# Patient Record
Sex: FEMALE | Race: WHITE
[De-identification: names, ages, dates, MRNs, and addresses within clinical notes are randomized per-mention and may not be internally consistent; named-entity substitution may affect disease eponyms.]

---

## 2017-10-03 ENCOUNTER — HOSPITAL ENCOUNTER (OUTPATIENT)
Dept: HOSPITAL 1 - RD | Age: 77
Discharge: HOME | End: 2017-10-03
Attending: FAMILY MEDICINE
Payer: COMMERCIAL
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
## 2017-10-03 DIAGNOSIS — S89.92XA: Primary | ICD-10-CM

## 2017-10-31 ENCOUNTER — HOSPITAL ENCOUNTER (INPATIENT)
Dept: HOSPITAL 1 - ED | Age: 77
LOS: 3 days | Discharge: HOME | DRG: 73 | End: 2017-11-03
Attending: FAMILY MEDICINE | Admitting: FAMILY MEDICINE
Payer: COMMERCIAL

## 2017-10-31 VITALS — HEIGHT: 70 IN | BODY MASS INDEX: 23.62 KG/M2 | WEIGHT: 165 LBS

## 2017-10-31 DIAGNOSIS — G90.9: Primary | ICD-10-CM

## 2017-10-31 DIAGNOSIS — M62.830: ICD-10-CM

## 2017-10-31 DIAGNOSIS — Z79.01: ICD-10-CM

## 2017-10-31 DIAGNOSIS — M54.42: ICD-10-CM

## 2017-10-31 DIAGNOSIS — E78.5: ICD-10-CM

## 2017-10-31 DIAGNOSIS — I48.2: ICD-10-CM

## 2017-10-31 DIAGNOSIS — E44.0: ICD-10-CM

## 2017-10-31 DIAGNOSIS — N17.0: ICD-10-CM

## 2017-10-31 DIAGNOSIS — Z85.3: ICD-10-CM

## 2017-10-31 NOTE — NUR
PT BIB DAUGHTER FOR C/O PALPATATION AND WEAKNESS. PT STATES SYMPTOMS BEGAN WHEN
SHE WAS GETTING READY FOR BED. PT DENIES ANY CHEST PAIN, BUT STATES "I CANT
FEEL MY HEART BEATING". PT AWAKE AND ALERT, RESP EVEN AND UNLABORED. PT PLACED
ON FULL MONITORS, BEDSIDE RAILS UP FOR SAFETY, NO DISTRESS NOTED

## 2017-11-01 VITALS — SYSTOLIC BLOOD PRESSURE: 108 MMHG | DIASTOLIC BLOOD PRESSURE: 58 MMHG

## 2017-11-01 VITALS — SYSTOLIC BLOOD PRESSURE: 124 MMHG | DIASTOLIC BLOOD PRESSURE: 65 MMHG

## 2017-11-01 VITALS — SYSTOLIC BLOOD PRESSURE: 119 MMHG | DIASTOLIC BLOOD PRESSURE: 79 MMHG

## 2017-11-01 VITALS — DIASTOLIC BLOOD PRESSURE: 79 MMHG | SYSTOLIC BLOOD PRESSURE: 133 MMHG

## 2017-11-01 VITALS — SYSTOLIC BLOOD PRESSURE: 112 MMHG | DIASTOLIC BLOOD PRESSURE: 66 MMHG

## 2017-11-01 VITALS — SYSTOLIC BLOOD PRESSURE: 129 MMHG | DIASTOLIC BLOOD PRESSURE: 64 MMHG

## 2017-11-01 VITALS — SYSTOLIC BLOOD PRESSURE: 105 MMHG | DIASTOLIC BLOOD PRESSURE: 64 MMHG

## 2017-11-01 LAB
ALBUMIN SERPL-MCNC: 3.2 G/DL (ref 3.4–5)
ALP SERPL-CCNC: 59 U/L (ref 46–116)
ALT SERPL-CCNC: 21 U/L (ref 14–59)
AMPHETAMINES UR QL SCN: (no result)
AST SERPL-CCNC: 16 U/L (ref 15–37)
BASOPHILS NFR BLD: 0.1 % (ref 0–2)
BASOPHILS NFR BLD: 0.3 % (ref 0–2)
BILIRUB SERPL-MCNC: 0.26 MG/DL (ref 0.2–1)
BUN SERPL-MCNC: 14 MG/DL (ref 7–18)
BUN SERPL-MCNC: 20 MG/DL (ref 7–18)
CALCIUM SERPL-MCNC: 8.2 MG/DL (ref 8.5–10.1)
CALCIUM SERPL-MCNC: 8.3 MG/DL (ref 8.5–10.1)
CHLORIDE SERPL-SCNC: 108 MMOL/L (ref 98–107)
CHLORIDE SERPL-SCNC: 109 MMOL/L (ref 98–107)
CHOLEST SERPL-MCNC: 185 MG/DL (ref ?–200)
CHOLEST/HDLC SERPL: 2.9 MG/DL
CO2 SERPL-SCNC: 27.7 MMOL/L (ref 21–32)
CO2 SERPL-SCNC: 28.3 MMOL/L (ref 21–32)
CREAT SERPL-MCNC: 0.8 MG/DL (ref 0.6–1)
CREAT SERPL-MCNC: 0.8 MG/DL (ref 0.6–1)
ERYTHROCYTE [DISTWIDTH] IN BLOOD BY AUTOMATED COUNT: 12.9 % (ref 11.5–14.5)
ERYTHROCYTE [DISTWIDTH] IN BLOOD BY AUTOMATED COUNT: 13 % (ref 11.5–14.5)
GFR SERPLBLD BASED ON 1.73 SQ M-ARVRAT: (no result) ML/MIN
GFR SERPLBLD BASED ON 1.73 SQ M-ARVRAT: (no result) ML/MIN
GLUCOSE SERPL-MCNC: 81 MG/DL (ref 74–106)
GLUCOSE SERPL-MCNC: 92 MG/DL (ref 74–106)
HDLC SERPL-MCNC: 63 MG/DL (ref 40–60)
MAGNESIUM SERPL-MCNC: 2 MG/DL (ref 1.8–2.4)
MICROSCOPIC UR-IMP: YES
PHOSPHATE SERPL-MCNC: 3.9 MG/DL (ref 2.5–4.9)
PLATELET # BLD: 192 X10^3MCL (ref 130–400)
PLATELET # BLD: 193 X10^3MCL (ref 130–400)
POTASSIUM SERPL-SCNC: 3.2 MMOL/L (ref 3.5–5.1)
POTASSIUM SERPL-SCNC: 3.6 MMOL/L (ref 3.5–5.1)
PROT SERPL-MCNC: 7 G/DL (ref 6.4–8.2)
RBC # UR STRIP.AUTO: (no result) /UL
SODIUM SERPL-SCNC: 143 MMOL/L (ref 136–145)
SODIUM SERPL-SCNC: 144 MMOL/L (ref 136–145)
T3 SERPL-MCNC: 1.05 NG/ML
T3RU NFR SERPL: 33 % UPTAKE (ref 30–39)
T4 FREE SERPL-MCNC: 0.86 NG/DL (ref 0.76–1.46)
T4 SERPL-MCNC: 7 UG/DL (ref 4.7–13.3)
T4/T3 UPTAKE INDEX SERPL: 2.3 UG/DL (ref 1.4–4.5)
TRIGL SERPL-MCNC: 56 MG/DL (ref ?–150)
UA SPECIFIC GRAVITY: <=1.005 (ref 1–1.03)

## 2017-11-01 NOTE — NUR
PT IS A/O X4, VERBAL RESPONSIVE, ABLE TO TELL WHAT SHE NEEDS. DENY ANY
DIZZINESS AT THIS TIME. LUNG SOUND CLEAR BILATERAL, NO COUGH, NO SOB, PT IS ON
TELE 8, NSR, DENY ANY CHEST PAIN OR DISCOMFORT, BOWEL SOUND PRESENT ALL 4
QUADRANTS, NO DISTENTION, NO TENDER. PEDAL PULSE PRESENT BOTH FEET, NO EDEMA
NOTED, IV AT LEFT HAND, NO LEAKING, NO INFILTRATION. ALL ADLS ASSIST, ALL NEED
MET, CALL LIGHT IN REACH, WILL CONTINUE TO MONITOR.

## 2017-11-01 NOTE — NUR
RECEIVED PT FROM ED, NO ACUTE DISTRESS, A/O X4. DENIES CHEST PAIN OR DIZZINESS
AT THIS TIME. LUNG SOUNDS DIMINISHED IN BILATERAL BASES, DENIES SOB. PT
REPORTS DIARRHEA, LAST BM 10/31/17, BOWEL SOUNDS ACTIVE. IV PATENT AND INTACT.
BED IN LOWEST POSITION, SIDE RAILS UP X2, CALL LIGHT WITHIN REACH. ORIENTED PT
TO ROOM. WILL CONTINUE TO MONITOR.

## 2017-11-01 NOTE — NUR
COMFIRMED WITH DR. ROSENTHAL THAT HE WAS AWARE OF CONVERSION TO AFIB, MADE AWARE
PT HAD NO MEDICATION ON BOARD FOR RATE CONTROL, MD STATED HE WAS AWARE AND
WILL BE ORDERING MEDICATION. WILL AWAIT NEW ORDERS.

## 2017-11-01 NOTE — NUR
NOTED ORDERS FROM DR. ROSENTHAL FOR METOPROLOL 5MG IVP TO BE GIVEN AT THIS TIME.
PT MEDICATED AS ORDERED. PRE-ADMINSITRATION PT WITH  B/P 131/91. PT
ASSYMPTOMATIC AT THIS TIME. TELE MONITOR NOTIFIED OF MEDICATION PUSH AND GIVEN
SLOWLY OVER 5 MIN.
1340: PT CONVERTED BACK TO NSR WITH PAC/PVCS HR IN 70S. DENIES ANY DISCOMFORT.
1350: RECHECKED VS AT THIS TIME. B/P 112/66 HR 63 NSR WITH PAC/PVCS. PT
REMAINS ASSYMPTOMATIC. PAGE GATE MESSAGE SENT TO DR. ROSENTHAL. WILL CONT TO
MONITOR.

## 2017-11-01 NOTE — NUR
RECEIVED PT IN BED A/A/OX4 DENIES HA. RESP EVEN AND UNLABORED WITH CLEAR BS
BILAT. DENIES ANY SOB/CP/PRESSURE AT THIS TIME. NSR WITH FREQUENT PVC'S AND
PAC'S ON TELE. PER REPORT PT HAD RUNS OF AFIB LAST NIGHT. NONE REPORTED BY
TELE MONITOR THIS AM. PT WITH HX OF AFIB. RESP EVEN AND UNLABORED WITH
DIMINISHED BASES ON O2 AT 2L/MIN VIA NC. NO EDEMA NOTED WITH IVF TO LT HAND.
ABD SOFT, NONTENDER WITH ACTIVE BS X4. DENIES ANY N/V AT THIS TIME. REPORTS
EPISODES OF DIARRHEA THIS MORNING. REPORTS HAS BEEN HAVING DIARRRHEA OVER A
WEEK. PT AMBULATORY. CALL LIGHT IN REACH NEEDS ATTENDED TO.

## 2017-11-01 NOTE — NUR
SPOKE WITH DR. ARIADNA GARCIA HE WAS AWARE PT HAD CONVERTED BACK TO NSR.
MADE AWARE THAT PT MAY NOT TOLEARET IVP METOPROLOL SINCE SBP HAD DROPPED TO
106. PER MD WILL CHANGE ORDER, SINCE PT CURRENTLY HAS ORDER FOR METOPROLOL IVP
BID. WILL AWAIT ANY FURTHER ORDERS.

## 2017-11-01 NOTE — NUR
PT RESTING AT THIS TIME, DENIES ANY DISCOMFORT. IVF INFUSING. PT HAD 1
EPISODE OF CONVERTING TO AFIB, HAS REMAINED NSR WITH PACS/PVCS. DENIED ANY
EPISODES OF PALPITATION. STARTED ON METOPROLOL DAILY. CALL LIGHT IN REACH
NEEDS ATTENDED TO.

## 2017-11-01 NOTE — NUR
MADE AWARE BY TELE MONITOR THAT PT HAD CONVERTED BACK TO AFIB HR 'S. PT
ASSYMPTOMATIC SITTING AT BEDSIDE CHAIR WAITING FOR LUNCH. MESSAGE SENT TO DR. ROSENTHAL VIA PAGE GATE. WILL CONT TO MONITOR.

## 2017-11-01 NOTE — NUR
PT SLEPT PERIODICALLY THROUGHOUT NIGHT, NO ACUTE DISTRESS. ALL NEEDS MET AND
ATTENDED TO. NO SIGNIFICANT CHANGES. IV PATENT AND INTACT. BED IN LOWEST
POSITION, SIDE RAILS UP X2, SCDS IN PLACE, CALL LIGHT WITHIN REACH. WILL
ENDORSE CARE TO ONCOMING NURSE.

## 2017-11-02 VITALS — SYSTOLIC BLOOD PRESSURE: 135 MMHG | DIASTOLIC BLOOD PRESSURE: 64 MMHG

## 2017-11-02 VITALS — DIASTOLIC BLOOD PRESSURE: 67 MMHG | SYSTOLIC BLOOD PRESSURE: 130 MMHG

## 2017-11-02 VITALS — SYSTOLIC BLOOD PRESSURE: 143 MMHG | DIASTOLIC BLOOD PRESSURE: 78 MMHG

## 2017-11-02 VITALS — SYSTOLIC BLOOD PRESSURE: 144 MMHG | DIASTOLIC BLOOD PRESSURE: 79 MMHG

## 2017-11-02 VITALS — DIASTOLIC BLOOD PRESSURE: 67 MMHG | SYSTOLIC BLOOD PRESSURE: 119 MMHG

## 2017-11-02 LAB
BASOPHILS NFR BLD: 0.3 % (ref 0–2)
BUN SERPL-MCNC: 13 MG/DL (ref 7–18)
CALCIUM SERPL-MCNC: 8.4 MG/DL (ref 8.5–10.1)
CHLORIDE SERPL-SCNC: 111 MMOL/L (ref 98–107)
CO2 SERPL-SCNC: 28.5 MMOL/L (ref 21–32)
CREAT SERPL-MCNC: 0.7 MG/DL (ref 0.6–1)
ERYTHROCYTE [DISTWIDTH] IN BLOOD BY AUTOMATED COUNT: 13.5 % (ref 11.5–14.5)
GFR SERPLBLD BASED ON 1.73 SQ M-ARVRAT: (no result) ML/MIN
GLUCOSE SERPL-MCNC: 75 MG/DL (ref 74–106)
PLATELET # BLD: 167 X10^3MCL (ref 130–400)
POTASSIUM SERPL-SCNC: 4.4 MMOL/L (ref 3.5–5.1)
SODIUM SERPL-SCNC: 145 MMOL/L (ref 136–145)

## 2017-11-02 NOTE — NUR
PT RESTING COMFORTABLY AT THIS TIME WITH FAMILY AT BEDSIDE. DENIES ANY
DISCOMFORT. AMBULATING TO BR WITH SUPERVISION. CALL NO EPISODES OG AFIB
REPORTED. PT C/O WITH NSR WITH PACS/PVCS AND OCC BIGEMINY. NO EPISODES OF
CP/PRESSURE AT THIS TIME. CALL LIGHT IN REACH NEEDS ATTENDED TO.

## 2017-11-02 NOTE — NUR
RECEIVED PT IN BED A/A/OX4 DENIES HA. RESP EVEN AND UNLABORED WITH CLEAR BS
BILAT. DENIES ANY SOB/CP/PRESSURE AT THIS TIME. NSR WITH PACS/PVCS ON TELE.
DENIES ANY FURTHER EPISODES OF PALPITATION. NO EDEMA NOTED. ON COUMADIN FOR HX
AFIB. ABD SOFT, NONTENDER WITH ACTIVE BS X4. DENIES ANY N/V AT THIS TIME.
VOIDING FREELY. AMBULATORY. CALL LIGHT IN REACH NEEDS ATTENDED TO.

## 2017-11-02 NOTE — NUR
PT IS ALERT AND ORIENTED X 4. PLEASANT AND COOPERATIVE. LUNGS DIMINISHED ON
AUSCULTATIONS BILATERALLY. ROOM AIR. NEGATIVE CXR. AMBULATORY. STILL HAS IV NS
AT 40 ML PER HOUR INFUSING WELL IN THE LEFT HAND. PATENT AND INTACT. TELE NO.
8 NSR AND PT HAS HX OF A-FIB. STILL TAKING COUMADIN DAILY. PT C/O SHE HAS 4X
LOOSE STOOLS. ROUTINE LOMOTIL WAS ORDERED AND PT WAS MEDICATED. MADE
COMFORTABLE IN BED. CALL LIGHT WITHIN EASY REACH.

## 2017-11-02 NOTE — NUR
PT IS AWAKE, VERBAL RESPONSIVE, DENY ANY RESPIRATORY DISTRESS, DENY ANY PAIN
OR DISCOMFORT, DENY ANY CHEST PAIN, IV AT LEFT HAND, NO LEAKING, NO
INFILTRATON. ALL ADLS ASSIST, ALL NEED MET, CALL LIGHT IN REACH, WILL CONTINUE
TO MONITOR.

## 2017-11-02 NOTE — NUR
DISCUSSED WITH DR. ROSENTHAL IF PT STILL REQUIRED IVF AT 100ML/HR. PER MD WILL
DROP DRIP RATE TO 40ML/HR. WILL CONT TO MONITOR.

## 2017-11-03 VITALS — SYSTOLIC BLOOD PRESSURE: 144 MMHG | DIASTOLIC BLOOD PRESSURE: 71 MMHG

## 2017-11-03 VITALS — SYSTOLIC BLOOD PRESSURE: 118 MMHG | DIASTOLIC BLOOD PRESSURE: 71 MMHG

## 2017-11-03 VITALS — DIASTOLIC BLOOD PRESSURE: 71 MMHG | SYSTOLIC BLOOD PRESSURE: 144 MMHG

## 2017-11-03 VITALS — SYSTOLIC BLOOD PRESSURE: 122 MMHG | DIASTOLIC BLOOD PRESSURE: 59 MMHG

## 2017-11-03 LAB
BASOPHILS NFR BLD: 0.2 % (ref 0–2)
BUN SERPL-MCNC: 12 MG/DL (ref 7–18)
CALCIUM SERPL-MCNC: 8.3 MG/DL (ref 8.5–10.1)
CHLORIDE SERPL-SCNC: 109 MMOL/L (ref 98–107)
CO2 SERPL-SCNC: 28.9 MMOL/L (ref 21–32)
CREAT SERPL-MCNC: 0.7 MG/DL (ref 0.6–1)
ERYTHROCYTE [DISTWIDTH] IN BLOOD BY AUTOMATED COUNT: 12.9 % (ref 11.5–14.5)
GFR SERPLBLD BASED ON 1.73 SQ M-ARVRAT: (no result) ML/MIN
GLUCOSE SERPL-MCNC: 83 MG/DL (ref 74–106)
PLATELET # BLD: 166 X10^3MCL (ref 130–400)
POTASSIUM SERPL-SCNC: 3.8 MMOL/L (ref 3.5–5.1)
SODIUM SERPL-SCNC: 142 MMOL/L (ref 136–145)

## 2017-11-03 NOTE — NUR
PT STATED THAT AFTER THE SECOND DOSE OF LOMOTIL HER DIARRHEA CALMED DOWN.
STILL HAS IV NS AT 40 ML PER HOUR INFUSING WELL. MADE COMFORTABLE IN BED. CALL
LIGHT WITHIN EASY REACH.

## 2017-11-03 NOTE — NUR
PT SITTING AT SIDE OF BED, A/OX4. NO REPORT OF PAIN. NO DIZZINESS. NO CHEST
PAIN. NO SIGN OF ACUTE DISTRESS. IV FLUIDS FLOWING. BED IN LOW POSITION. CALL
LIGHT WITHIN REACH. WILL CONTINUE TO MONITOR.

## 2017-11-03 NOTE — NUR
PT D/C TO HOME, AMBULATORY. ESCORTED BY DAVIAN BORGES. A/OX4. NO REPORT OF PAIN.
NO SIGN OF ACUTE DISTRESS. NO DIZZINESS REPORTED. TELE REMOVED. IV REMOVED,
CATHETER IN TACT. PATIENT EDUCATION PROVIDED. DIRECTED TO FOLLOW UP WITH PCP
AT UPCOMING APPT. PT VERBALIZED UNDERSTANDING. BELONGINGS WITH PATIENT.

## 2017-12-31 ENCOUNTER — HOSPITAL ENCOUNTER (EMERGENCY)
Dept: HOSPITAL 1 - ED | Age: 77
LOS: 1 days | Discharge: HOME | End: 2018-01-01
Payer: COMMERCIAL

## 2017-12-31 VITALS
HEIGHT: 70 IN | BODY MASS INDEX: 22.9 KG/M2 | WEIGHT: 159.99 LBS | WEIGHT: 159.99 LBS | BODY MASS INDEX: 22.9 KG/M2 | HEIGHT: 70 IN
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
## 2017-12-31 DIAGNOSIS — Z85.3: ICD-10-CM
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
## 2017-12-31 DIAGNOSIS — S42.201A: Primary | ICD-10-CM

## 2017-12-31 DIAGNOSIS — Z88.1: ICD-10-CM
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
## 2018-01-01 VITALS — SYSTOLIC BLOOD PRESSURE: 117 MMHG | DIASTOLIC BLOOD PRESSURE: 67 MMHG

## 2018-02-02 ENCOUNTER — HOSPITAL ENCOUNTER (OUTPATIENT)
Dept: HOSPITAL 1 - US | Age: 78
Discharge: HOME | End: 2018-02-02
Attending: FAMILY MEDICINE
Payer: COMMERCIAL

## 2018-02-02 DIAGNOSIS — R10.9: Primary | ICD-10-CM

## 2018-02-02 PROCEDURE — BW40ZZZ ULTRASONOGRAPHY OF ABDOMEN: ICD-10-PCS

## 2018-05-18 ENCOUNTER — HOSPITAL ENCOUNTER (EMERGENCY)
Dept: HOSPITAL 1 - ED | Age: 78
Discharge: HOME | End: 2018-05-18
Payer: COMMERCIAL

## 2018-05-18 VITALS — BODY MASS INDEX: 26.13 KG/M2 | WEIGHT: 182.5 LBS | HEIGHT: 70 IN

## 2018-05-18 VITALS — SYSTOLIC BLOOD PRESSURE: 117 MMHG | DIASTOLIC BLOOD PRESSURE: 90 MMHG

## 2018-05-18 DIAGNOSIS — R05: ICD-10-CM

## 2018-05-18 DIAGNOSIS — I11.9: Primary | ICD-10-CM

## 2018-05-18 DIAGNOSIS — Z88.8: ICD-10-CM

## 2018-05-18 DIAGNOSIS — Z90.710: ICD-10-CM

## 2018-05-18 LAB
ALBUMIN SERPL-MCNC: 2.8 G/DL (ref 3.4–5)
ALP SERPL-CCNC: 159 U/L (ref 46–116)
ALT SERPL-CCNC: 67 U/L (ref 14–59)
AST SERPL-CCNC: 29 U/L (ref 15–37)
BASOPHILS NFR BLD: 0.1 % (ref 0–2)
BILIRUB SERPL-MCNC: 0.5 MG/DL (ref 0.2–1)
BUN SERPL-MCNC: 13 MG/DL (ref 7–18)
CALCIUM SERPL-MCNC: 8.1 MG/DL (ref 8.5–10.1)
CHLORIDE SERPL-SCNC: 105 MMOL/L (ref 98–107)
CHOLEST SERPL-MCNC: 116 MG/DL (ref ?–200)
CO2 SERPL-SCNC: 23.8 MMOL/L (ref 21–32)
CREAT SERPL-MCNC: 0.8 MG/DL (ref 0.6–1)
ERYTHROCYTE [DISTWIDTH] IN BLOOD BY AUTOMATED COUNT: 15.4 % (ref 11.5–14.5)
GFR SERPLBLD BASED ON 1.73 SQ M-ARVRAT: (no result) ML/MIN
GLUCOSE SERPL-MCNC: 122 MG/DL (ref 74–106)
HDLC SERPL-MCNC: 25 MG/DL (ref 40–60)
PLATELET # BLD: 186 X10^3MCL (ref 130–400)
POTASSIUM SERPL-SCNC: 4.2 MMOL/L (ref 3.5–5.1)
PROT SERPL-MCNC: 6.2 G/DL (ref 6.4–8.2)
SODIUM SERPL-SCNC: 136 MMOL/L (ref 136–145)

## 2018-05-19 ENCOUNTER — HOSPITAL ENCOUNTER (INPATIENT)
Dept: HOSPITAL 1 - ED | Age: 78
LOS: 5 days | Discharge: HOME HEALTH | DRG: 286 | End: 2018-05-24
Attending: FAMILY MEDICINE | Admitting: FAMILY MEDICINE
Payer: COMMERCIAL

## 2018-05-19 VITALS — SYSTOLIC BLOOD PRESSURE: 112 MMHG | DIASTOLIC BLOOD PRESSURE: 65 MMHG

## 2018-05-19 VITALS
WEIGHT: 167.44 LBS | BODY MASS INDEX: 24.8 KG/M2 | BODY MASS INDEX: 24.8 KG/M2 | HEIGHT: 69.02 IN | HEIGHT: 69.02 IN | WEIGHT: 167.44 LBS

## 2018-05-19 VITALS — DIASTOLIC BLOOD PRESSURE: 65 MMHG | SYSTOLIC BLOOD PRESSURE: 110 MMHG

## 2018-05-19 VITALS — SYSTOLIC BLOOD PRESSURE: 103 MMHG | DIASTOLIC BLOOD PRESSURE: 58 MMHG

## 2018-05-19 DIAGNOSIS — I08.3: ICD-10-CM

## 2018-05-19 DIAGNOSIS — E87.6: ICD-10-CM

## 2018-05-19 DIAGNOSIS — E11.9: ICD-10-CM

## 2018-05-19 DIAGNOSIS — Z99.81: ICD-10-CM

## 2018-05-19 DIAGNOSIS — Z88.1: ICD-10-CM

## 2018-05-19 DIAGNOSIS — Z79.899: ICD-10-CM

## 2018-05-19 DIAGNOSIS — J90: ICD-10-CM

## 2018-05-19 DIAGNOSIS — Z53.29: ICD-10-CM

## 2018-05-19 DIAGNOSIS — Z79.01: ICD-10-CM
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
## 2018-05-19 DIAGNOSIS — I48.2: ICD-10-CM

## 2018-05-19 DIAGNOSIS — J96.01: ICD-10-CM

## 2018-05-19 DIAGNOSIS — I11.0: Primary | ICD-10-CM

## 2018-05-19 DIAGNOSIS — D64.9: ICD-10-CM

## 2018-05-19 DIAGNOSIS — E87.2: ICD-10-CM

## 2018-05-19 DIAGNOSIS — N17.0: ICD-10-CM

## 2018-05-19 DIAGNOSIS — E44.0: ICD-10-CM

## 2018-05-19 DIAGNOSIS — G90.8: ICD-10-CM

## 2018-05-19 DIAGNOSIS — I25.10: ICD-10-CM

## 2018-05-19 DIAGNOSIS — Z90.710: ICD-10-CM

## 2018-05-19 DIAGNOSIS — E87.1: ICD-10-CM

## 2018-05-19 DIAGNOSIS — S09.90XA: ICD-10-CM
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
## 2018-05-19 DIAGNOSIS — R74.0: ICD-10-CM

## 2018-05-19 DIAGNOSIS — S70.02XA: ICD-10-CM

## 2018-05-19 DIAGNOSIS — E83.51: ICD-10-CM

## 2018-05-19 DIAGNOSIS — J44.1: ICD-10-CM

## 2018-05-19 DIAGNOSIS — J69.0: ICD-10-CM
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
## 2018-05-19 DIAGNOSIS — I50.43: ICD-10-CM

## 2018-05-19 DIAGNOSIS — Z90.11: ICD-10-CM

## 2018-05-19 DIAGNOSIS — F32.9: ICD-10-CM

## 2018-05-19 LAB
ALBUMIN SERPL-MCNC: 3.1 G/DL (ref 3.4–5)
ALP SERPL-CCNC: 196 U/L (ref 46–116)
ALT SERPL-CCNC: 103 U/L (ref 14–59)
AMYLASE SERPL-CCNC: 18 U/L (ref 25–115)
AST SERPL-CCNC: 78 U/L (ref 15–37)
BASOPHILS NFR BLD: 0 % (ref 0–2)
BILIRUB SERPL-MCNC: 1.2 MG/DL (ref 0.2–1)
BUN SERPL-MCNC: 23 MG/DL (ref 7–18)
CALCIUM SERPL-MCNC: 8.1 MG/DL (ref 8.5–10.1)
CHLORIDE SERPL-SCNC: 100 MMOL/L (ref 98–107)
CO2 SERPL-SCNC: 23.2 MMOL/L (ref 21–32)
CREAT SERPL-MCNC: 1.1 MG/DL (ref 0.6–1)
ERYTHROCYTE [DISTWIDTH] IN BLOOD BY AUTOMATED COUNT: 15.5 % (ref 11.5–14.5)
GFR SERPLBLD BASED ON 1.73 SQ M-ARVRAT: (no result) ML/MIN
GLUCOSE SERPL-MCNC: 119 MG/DL (ref 74–106)
LIPASE SERPL-CCNC: 98 IU/L (ref 73–393)
MAGNESIUM SERPL-MCNC: 2 MG/DL (ref 1.8–2.4)
PHOSPHATE SERPL-MCNC: 4.9 MG/DL (ref 2.5–4.9)
PLATELET # BLD: 193 X10^3MCL (ref 130–400)
POTASSIUM SERPL-SCNC: 5.1 MMOL/L (ref 3.5–5.1)
PROT SERPL-MCNC: 6.6 G/DL (ref 6.4–8.2)
SODIUM SERPL-SCNC: 131 MMOL/L (ref 136–145)
T3 SERPL-MCNC: 0.58 NG/ML
T3RU NFR SERPL: 40 % UPTAKE (ref 30–39)
T4 FREE SERPL-MCNC: 1.47 NG/DL (ref 0.76–1.46)
T4 SERPL-MCNC: 6.5 UG/DL (ref 4.7–13.3)
T4/T3 UPTAKE INDEX SERPL: 2.6 UG/DL (ref 1.4–4.5)

## 2018-05-19 PROCEDURE — C1729 CATH, DRAINAGE: HCPCS

## 2018-05-19 PROCEDURE — C1751 CATH, INF, PER/CENT/MIDLINE: HCPCS

## 2018-05-19 PROCEDURE — C1769 GUIDE WIRE: HCPCS

## 2018-05-19 PROCEDURE — C1760 CLOSURE DEV, VASC: HCPCS

## 2018-05-19 PROCEDURE — C1894 INTRO/SHEATH, NON-LASER: HCPCS

## 2018-05-20 VITALS — DIASTOLIC BLOOD PRESSURE: 56 MMHG | SYSTOLIC BLOOD PRESSURE: 98 MMHG

## 2018-05-20 VITALS — SYSTOLIC BLOOD PRESSURE: 105 MMHG | DIASTOLIC BLOOD PRESSURE: 64 MMHG

## 2018-05-20 VITALS — DIASTOLIC BLOOD PRESSURE: 62 MMHG | SYSTOLIC BLOOD PRESSURE: 89 MMHG

## 2018-05-20 VITALS — DIASTOLIC BLOOD PRESSURE: 51 MMHG | SYSTOLIC BLOOD PRESSURE: 83 MMHG

## 2018-05-20 VITALS — SYSTOLIC BLOOD PRESSURE: 88 MMHG | DIASTOLIC BLOOD PRESSURE: 58 MMHG

## 2018-05-20 VITALS — DIASTOLIC BLOOD PRESSURE: 63 MMHG | SYSTOLIC BLOOD PRESSURE: 93 MMHG

## 2018-05-20 VITALS — SYSTOLIC BLOOD PRESSURE: 103 MMHG | DIASTOLIC BLOOD PRESSURE: 61 MMHG

## 2018-05-20 LAB
APPEARANCE SPUN FLD: (no result)
BASOPHILS NFR BLD: 0 % (ref 0–2)
BODY FLD TYPE: (no result)
BUN SERPL-MCNC: 21 MG/DL (ref 7–18)
CALCIUM SERPL-MCNC: 8.2 MG/DL (ref 8.5–10.1)
CHLORIDE SERPL-SCNC: 102 MMOL/L (ref 98–107)
CO2 SERPL-SCNC: 27.1 MMOL/L (ref 21–32)
COLOR FLD: (no result)
CREAT SERPL-MCNC: 0.9 MG/DL (ref 0.6–1)
ERYTHROCYTE [DISTWIDTH] IN BLOOD BY AUTOMATED COUNT: 15.1 % (ref 11.5–14.5)
GFR SERPLBLD BASED ON 1.73 SQ M-ARVRAT: (no result) ML/MIN
GLUCOSE SERPL-MCNC: 90 MG/DL (ref 74–106)
MAGNESIUM SERPL-MCNC: 1.8 MG/DL (ref 1.8–2.4)
MICROSCOPIC UR-IMP: NO
PHOSPHATE SERPL-MCNC: 4 MG/DL (ref 2.5–4.9)
PLATELET # BLD: 180 X10^3MCL (ref 130–400)
POTASSIUM SERPL-SCNC: 4.3 MMOL/L (ref 3.5–5.1)
RBC # FLD MANUAL: 138.9 /CUMM
RBC # UR STRIP.AUTO: NEGATIVE /UL
SODIUM SERPL-SCNC: 135 MMOL/L (ref 136–145)
UA SPECIFIC GRAVITY: 1.01 (ref 1–1.03)
WBC # FLD MANUAL: 3.3 /CUMM

## 2018-05-20 PROCEDURE — 0W9B3ZZ DRAINAGE OF LEFT PLEURAL CAVITY, PERCUTANEOUS APPROACH: ICD-10-PCS

## 2018-05-21 VITALS — DIASTOLIC BLOOD PRESSURE: 74 MMHG | SYSTOLIC BLOOD PRESSURE: 103 MMHG

## 2018-05-21 VITALS — SYSTOLIC BLOOD PRESSURE: 110 MMHG | DIASTOLIC BLOOD PRESSURE: 50 MMHG

## 2018-05-21 VITALS — DIASTOLIC BLOOD PRESSURE: 56 MMHG | SYSTOLIC BLOOD PRESSURE: 104 MMHG

## 2018-05-21 VITALS — SYSTOLIC BLOOD PRESSURE: 104 MMHG | DIASTOLIC BLOOD PRESSURE: 68 MMHG

## 2018-05-21 VITALS — DIASTOLIC BLOOD PRESSURE: 60 MMHG | SYSTOLIC BLOOD PRESSURE: 96 MMHG

## 2018-05-21 VITALS — SYSTOLIC BLOOD PRESSURE: 95 MMHG | DIASTOLIC BLOOD PRESSURE: 59 MMHG

## 2018-05-21 VITALS — SYSTOLIC BLOOD PRESSURE: 106 MMHG | DIASTOLIC BLOOD PRESSURE: 71 MMHG

## 2018-05-21 LAB
BASOPHILS NFR BLD: 0.3 % (ref 0–2)
BUN SERPL-MCNC: 14 MG/DL (ref 7–18)
CALCIUM SERPL-MCNC: 7.8 MG/DL (ref 8.5–10.1)
CHLORIDE SERPL-SCNC: 104 MMOL/L (ref 98–107)
CO2 SERPL-SCNC: 29.5 MMOL/L (ref 21–32)
CREAT SERPL-MCNC: 0.8 MG/DL (ref 0.6–1)
ERYTHROCYTE [DISTWIDTH] IN BLOOD BY AUTOMATED COUNT: 15.1 % (ref 11.5–14.5)
GFR SERPLBLD BASED ON 1.73 SQ M-ARVRAT: (no result) ML/MIN
GLUCOSE SERPL-MCNC: 87 MG/DL (ref 74–106)
MAGNESIUM SERPL-MCNC: 1.8 MG/DL (ref 1.8–2.4)
PHOSPHATE SERPL-MCNC: 3.5 MG/DL (ref 2.5–4.9)
PLATELET # BLD: 157 X10^3MCL (ref 130–400)
POTASSIUM SERPL-SCNC: 3.6 MMOL/L (ref 3.5–5.1)
SODIUM SERPL-SCNC: 141 MMOL/L (ref 136–145)

## 2018-05-21 PROCEDURE — B2151ZZ FLUOROSCOPY OF LEFT HEART USING LOW OSMOLAR CONTRAST: ICD-10-PCS

## 2018-05-21 PROCEDURE — B2111ZZ FLUOROSCOPY OF MULTIPLE CORONARY ARTERIES USING LOW OSMOLAR CONTRAST: ICD-10-PCS

## 2018-05-21 PROCEDURE — 4A023N8 MEASUREMENT OF CARDIAC SAMPLING AND PRESSURE, BILATERAL, PERCUTANEOUS APPROACH: ICD-10-PCS

## 2018-05-22 VITALS — SYSTOLIC BLOOD PRESSURE: 104 MMHG | DIASTOLIC BLOOD PRESSURE: 54 MMHG

## 2018-05-22 VITALS — SYSTOLIC BLOOD PRESSURE: 113 MMHG | DIASTOLIC BLOOD PRESSURE: 59 MMHG

## 2018-05-22 VITALS — DIASTOLIC BLOOD PRESSURE: 55 MMHG | SYSTOLIC BLOOD PRESSURE: 109 MMHG

## 2018-05-22 VITALS — SYSTOLIC BLOOD PRESSURE: 105 MMHG | DIASTOLIC BLOOD PRESSURE: 94 MMHG

## 2018-05-22 VITALS — SYSTOLIC BLOOD PRESSURE: 97 MMHG | DIASTOLIC BLOOD PRESSURE: 50 MMHG

## 2018-05-22 VITALS — SYSTOLIC BLOOD PRESSURE: 80 MMHG | DIASTOLIC BLOOD PRESSURE: 50 MMHG

## 2018-05-22 LAB
BASOPHILS NFR BLD: 0.1 % (ref 0–2)
BUN SERPL-MCNC: 10 MG/DL (ref 7–18)
CALCIUM SERPL-MCNC: 7.7 MG/DL (ref 8.5–10.1)
CHLORIDE SERPL-SCNC: 101 MMOL/L (ref 98–107)
CO2 SERPL-SCNC: 33.7 MMOL/L (ref 21–32)
CREAT SERPL-MCNC: 0.8 MG/DL (ref 0.6–1)
ERYTHROCYTE [DISTWIDTH] IN BLOOD BY AUTOMATED COUNT: 15.7 % (ref 11.5–14.5)
GFR SERPLBLD BASED ON 1.73 SQ M-ARVRAT: (no result) ML/MIN
GLUCOSE SERPL-MCNC: 141 MG/DL (ref 74–106)
PLATELET # BLD: 169 X10^3MCL (ref 130–400)
POTASSIUM SERPL-SCNC: 3.4 MMOL/L (ref 3.5–5.1)
SODIUM SERPL-SCNC: 139 MMOL/L (ref 136–145)

## 2018-05-23 VITALS — SYSTOLIC BLOOD PRESSURE: 123 MMHG | DIASTOLIC BLOOD PRESSURE: 77 MMHG

## 2018-05-23 VITALS — SYSTOLIC BLOOD PRESSURE: 94 MMHG | DIASTOLIC BLOOD PRESSURE: 58 MMHG

## 2018-05-23 VITALS — DIASTOLIC BLOOD PRESSURE: 49 MMHG | SYSTOLIC BLOOD PRESSURE: 107 MMHG

## 2018-05-23 VITALS — DIASTOLIC BLOOD PRESSURE: 53 MMHG | SYSTOLIC BLOOD PRESSURE: 114 MMHG

## 2018-05-23 VITALS — SYSTOLIC BLOOD PRESSURE: 115 MMHG | DIASTOLIC BLOOD PRESSURE: 54 MMHG

## 2018-05-23 VITALS — SYSTOLIC BLOOD PRESSURE: 113 MMHG | DIASTOLIC BLOOD PRESSURE: 64 MMHG

## 2018-05-23 VITALS — SYSTOLIC BLOOD PRESSURE: 111 MMHG | DIASTOLIC BLOOD PRESSURE: 52 MMHG

## 2018-05-23 VITALS — SYSTOLIC BLOOD PRESSURE: 119 MMHG | DIASTOLIC BLOOD PRESSURE: 67 MMHG

## 2018-05-23 VITALS — DIASTOLIC BLOOD PRESSURE: 89 MMHG | SYSTOLIC BLOOD PRESSURE: 110 MMHG

## 2018-05-23 VITALS — DIASTOLIC BLOOD PRESSURE: 79 MMHG | SYSTOLIC BLOOD PRESSURE: 125 MMHG

## 2018-05-23 LAB
BASOPHILS NFR BLD: 0.3 % (ref 0–2)
BUN SERPL-MCNC: 9 MG/DL (ref 7–18)
CALCIUM SERPL-MCNC: 8 MG/DL (ref 8.5–10.1)
CHLORIDE SERPL-SCNC: 103 MMOL/L (ref 98–107)
CO2 SERPL-SCNC: 30.7 MMOL/L (ref 21–32)
CREAT SERPL-MCNC: 0.6 MG/DL (ref 0.6–1)
ERYTHROCYTE [DISTWIDTH] IN BLOOD BY AUTOMATED COUNT: 15.4 % (ref 11.5–14.5)
GFR SERPLBLD BASED ON 1.73 SQ M-ARVRAT: (no result) ML/MIN
GLUCOSE SERPL-MCNC: 92 MG/DL (ref 74–106)
PLATELET # BLD: 176 X10^3MCL (ref 130–400)
POTASSIUM SERPL-SCNC: 3.5 MMOL/L (ref 3.5–5.1)
SODIUM SERPL-SCNC: 139 MMOL/L (ref 136–145)

## 2018-05-23 PROCEDURE — 0W9B3ZZ DRAINAGE OF LEFT PLEURAL CAVITY, PERCUTANEOUS APPROACH: ICD-10-PCS

## 2018-05-23 PROCEDURE — 0W993ZZ DRAINAGE OF RIGHT PLEURAL CAVITY, PERCUTANEOUS APPROACH: ICD-10-PCS

## 2018-05-24 VITALS — SYSTOLIC BLOOD PRESSURE: 102 MMHG | DIASTOLIC BLOOD PRESSURE: 66 MMHG

## 2018-05-24 VITALS — SYSTOLIC BLOOD PRESSURE: 102 MMHG | DIASTOLIC BLOOD PRESSURE: 60 MMHG

## 2018-05-24 VITALS — SYSTOLIC BLOOD PRESSURE: 104 MMHG | DIASTOLIC BLOOD PRESSURE: 65 MMHG

## 2018-05-24 VITALS — SYSTOLIC BLOOD PRESSURE: 120 MMHG | DIASTOLIC BLOOD PRESSURE: 78 MMHG

## 2018-05-24 VITALS — DIASTOLIC BLOOD PRESSURE: 76 MMHG | SYSTOLIC BLOOD PRESSURE: 112 MMHG

## 2018-05-24 LAB
BASOPHILS NFR BLD: 0.3 % (ref 0–2)
BUN SERPL-MCNC: 7 MG/DL (ref 7–18)
CALCIUM SERPL-MCNC: 8.7 MG/DL (ref 8.5–10.1)
CHLORIDE SERPL-SCNC: 105 MMOL/L (ref 98–107)
CO2 SERPL-SCNC: 32.2 MMOL/L (ref 21–32)
CREAT SERPL-MCNC: 0.7 MG/DL (ref 0.6–1)
ERYTHROCYTE [DISTWIDTH] IN BLOOD BY AUTOMATED COUNT: 15.4 % (ref 11.5–14.5)
GFR SERPLBLD BASED ON 1.73 SQ M-ARVRAT: (no result) ML/MIN
GLUCOSE SERPL-MCNC: 95 MG/DL (ref 74–106)
MAGNESIUM SERPL-MCNC: 2.1 MG/DL (ref 1.8–2.4)
PHOSPHATE SERPL-MCNC: 3.2 MG/DL (ref 2.5–4.9)
PLATELET # BLD: 186 X10^3MCL (ref 130–400)
POTASSIUM SERPL-SCNC: 3.5 MMOL/L (ref 3.5–5.1)
SODIUM SERPL-SCNC: 143 MMOL/L (ref 136–145)

## 2018-06-23 ENCOUNTER — HOSPITAL ENCOUNTER (INPATIENT)
Dept: HOSPITAL 1 - ED | Age: 78
LOS: 5 days | Discharge: HOME | DRG: 291 | End: 2018-06-28
Attending: FAMILY MEDICINE | Admitting: FAMILY MEDICINE
Payer: COMMERCIAL

## 2018-06-23 VITALS — SYSTOLIC BLOOD PRESSURE: 123 MMHG | DIASTOLIC BLOOD PRESSURE: 85 MMHG

## 2018-06-23 VITALS
BODY MASS INDEX: 22.62 KG/M2 | HEIGHT: 70 IN | HEIGHT: 70 IN | WEIGHT: 158 LBS | BODY MASS INDEX: 22.62 KG/M2 | WEIGHT: 158 LBS

## 2018-06-23 DIAGNOSIS — N18.2: ICD-10-CM

## 2018-06-23 DIAGNOSIS — F32.9: ICD-10-CM

## 2018-06-23 DIAGNOSIS — N17.0: ICD-10-CM

## 2018-06-23 DIAGNOSIS — I25.5: ICD-10-CM

## 2018-06-23 DIAGNOSIS — Z85.3: ICD-10-CM

## 2018-06-23 DIAGNOSIS — I50.43: ICD-10-CM

## 2018-06-23 DIAGNOSIS — I48.2: ICD-10-CM

## 2018-06-23 DIAGNOSIS — I36.1: ICD-10-CM

## 2018-06-23 DIAGNOSIS — I25.10: ICD-10-CM

## 2018-06-23 DIAGNOSIS — Z79.01: ICD-10-CM

## 2018-06-23 DIAGNOSIS — N28.1: ICD-10-CM

## 2018-06-23 DIAGNOSIS — I34.0: ICD-10-CM

## 2018-06-23 DIAGNOSIS — D64.9: ICD-10-CM

## 2018-06-23 DIAGNOSIS — M41.34: ICD-10-CM

## 2018-06-23 DIAGNOSIS — M24.852: ICD-10-CM

## 2018-06-23 DIAGNOSIS — I13.0: Primary | ICD-10-CM

## 2018-06-23 DIAGNOSIS — R31.9: ICD-10-CM

## 2018-06-23 DIAGNOSIS — E44.1: ICD-10-CM

## 2018-06-23 DIAGNOSIS — M24.851: ICD-10-CM

## 2018-06-23 LAB
ALBUMIN SERPL-MCNC: 3.3 G/DL (ref 3.4–5)
ALP SERPL-CCNC: 101 U/L (ref 46–116)
ALT SERPL-CCNC: 41 U/L (ref 14–59)
AMYLASE SERPL-CCNC: 14 U/L (ref 25–115)
AST SERPL-CCNC: 28 U/L (ref 15–37)
BASOPHILS NFR BLD: 0.1 % (ref 0–2)
BILIRUB SERPL-MCNC: 0.63 MG/DL (ref 0.2–1)
BUN SERPL-MCNC: 21 MG/DL (ref 7–18)
CALCIUM SERPL-MCNC: 8.9 MG/DL (ref 8.5–10.1)
CHLORIDE SERPL-SCNC: 102 MMOL/L (ref 98–107)
CO2 SERPL-SCNC: 23.8 MMOL/L (ref 21–32)
CREAT SERPL-MCNC: 0.9 MG/DL (ref 0.6–1)
ERYTHROCYTE [DISTWIDTH] IN BLOOD BY AUTOMATED COUNT: 16.3 % (ref 11.5–14.5)
GFR SERPLBLD BASED ON 1.73 SQ M-ARVRAT: (no result) ML/MIN
GLUCOSE SERPL-MCNC: 110 MG/DL (ref 74–106)
LIPASE SERPL-CCNC: 130 IU/L (ref 73–393)
MAGNESIUM SERPL-MCNC: 2.2 MG/DL (ref 1.8–2.4)
MICROSCOPIC UR-IMP: YES
PHOSPHATE SERPL-MCNC: 4.5 MG/DL (ref 2.5–4.9)
PLATELET # BLD: 166 X10^3MCL (ref 130–400)
POTASSIUM SERPL-SCNC: 4.3 MMOL/L (ref 3.5–5.1)
PROT SERPL-MCNC: 7.2 G/DL (ref 6.4–8.2)
RBC # UR STRIP.AUTO: (no result) /UL
SODIUM SERPL-SCNC: 138 MMOL/L (ref 136–145)
T3RU NFR SERPL: 37 % UPTAKE (ref 30–39)
T4 FREE SERPL-MCNC: 1.31 NG/DL (ref 0.76–1.46)
T4 SERPL-MCNC: 8.8 UG/DL (ref 4.7–13.3)
T4/T3 UPTAKE INDEX SERPL: 3.3 UG/DL (ref 1.4–4.5)
UA SPECIFIC GRAVITY: <=1.005 (ref 1–1.03)

## 2018-06-24 VITALS — DIASTOLIC BLOOD PRESSURE: 87 MMHG | SYSTOLIC BLOOD PRESSURE: 123 MMHG

## 2018-06-24 VITALS — DIASTOLIC BLOOD PRESSURE: 76 MMHG | SYSTOLIC BLOOD PRESSURE: 134 MMHG

## 2018-06-24 VITALS — SYSTOLIC BLOOD PRESSURE: 123 MMHG | DIASTOLIC BLOOD PRESSURE: 77 MMHG

## 2018-06-24 VITALS — DIASTOLIC BLOOD PRESSURE: 72 MMHG | SYSTOLIC BLOOD PRESSURE: 122 MMHG

## 2018-06-24 VITALS — DIASTOLIC BLOOD PRESSURE: 80 MMHG | SYSTOLIC BLOOD PRESSURE: 117 MMHG

## 2018-06-24 LAB
BASOPHILS NFR BLD: 0.2 % (ref 0–2)
BUN SERPL-MCNC: 16 MG/DL (ref 7–18)
CALCIUM SERPL-MCNC: 8.3 MG/DL (ref 8.5–10.1)
CHLORIDE SERPL-SCNC: 106 MMOL/L (ref 98–107)
CHOLEST SERPL-MCNC: 190 MG/DL (ref ?–200)
CHOLEST/HDLC SERPL: 4.9 MG/DL
CO2 SERPL-SCNC: 27.5 MMOL/L (ref 21–32)
CREAT SERPL-MCNC: 0.8 MG/DL (ref 0.6–1)
ERYTHROCYTE [DISTWIDTH] IN BLOOD BY AUTOMATED COUNT: 16.7 % (ref 11.5–14.5)
GFR SERPLBLD BASED ON 1.73 SQ M-ARVRAT: (no result) ML/MIN
GLUCOSE SERPL-MCNC: 94 MG/DL (ref 74–106)
HDLC SERPL-MCNC: 39 MG/DL (ref 40–60)
MAGNESIUM SERPL-MCNC: 2.2 MG/DL (ref 1.8–2.4)
PHOSPHATE SERPL-MCNC: 3.7 MG/DL (ref 2.5–4.9)
PLATELET # BLD: 123 X10^3MCL (ref 130–400)
POTASSIUM SERPL-SCNC: 4.3 MMOL/L (ref 3.5–5.1)
SODIUM SERPL-SCNC: 138 MMOL/L (ref 136–145)
T3 SERPL-MCNC: 0.78 NG/ML
TRIGL SERPL-MCNC: 72 MG/DL (ref ?–150)

## 2018-06-25 VITALS — DIASTOLIC BLOOD PRESSURE: 70 MMHG | SYSTOLIC BLOOD PRESSURE: 116 MMHG

## 2018-06-25 VITALS — SYSTOLIC BLOOD PRESSURE: 126 MMHG | DIASTOLIC BLOOD PRESSURE: 88 MMHG

## 2018-06-25 VITALS — SYSTOLIC BLOOD PRESSURE: 104 MMHG | DIASTOLIC BLOOD PRESSURE: 59 MMHG

## 2018-06-25 VITALS — DIASTOLIC BLOOD PRESSURE: 90 MMHG | SYSTOLIC BLOOD PRESSURE: 131 MMHG

## 2018-06-25 VITALS — SYSTOLIC BLOOD PRESSURE: 115 MMHG | DIASTOLIC BLOOD PRESSURE: 71 MMHG

## 2018-06-25 LAB
BASOPHILS NFR BLD: 0.2 % (ref 0–2)
BUN SERPL-MCNC: 10 MG/DL (ref 7–18)
CALCIUM SERPL-MCNC: 8.9 MG/DL (ref 8.5–10.1)
CHLORIDE SERPL-SCNC: 103 MMOL/L (ref 98–107)
CO2 SERPL-SCNC: 27.1 MMOL/L (ref 21–32)
CREAT SERPL-MCNC: 0.7 MG/DL (ref 0.6–1)
ERYTHROCYTE [DISTWIDTH] IN BLOOD BY AUTOMATED COUNT: 16.7 % (ref 11.5–14.5)
GLUCOSE SERPL-MCNC: 94 MG/DL (ref 74–106)
PLATELET # BLD: 151 X10^3MCL (ref 130–400)
POTASSIUM SERPL-SCNC: 4.1 MMOL/L (ref 3.5–5.1)
SODIUM SERPL-SCNC: 139 MMOL/L (ref 136–145)

## 2018-06-26 VITALS — SYSTOLIC BLOOD PRESSURE: 100 MMHG | DIASTOLIC BLOOD PRESSURE: 70 MMHG

## 2018-06-26 VITALS — DIASTOLIC BLOOD PRESSURE: 62 MMHG | SYSTOLIC BLOOD PRESSURE: 128 MMHG

## 2018-06-26 VITALS — SYSTOLIC BLOOD PRESSURE: 123 MMHG | DIASTOLIC BLOOD PRESSURE: 82 MMHG

## 2018-06-26 VITALS — DIASTOLIC BLOOD PRESSURE: 80 MMHG | SYSTOLIC BLOOD PRESSURE: 137 MMHG

## 2018-06-26 VITALS — DIASTOLIC BLOOD PRESSURE: 70 MMHG | SYSTOLIC BLOOD PRESSURE: 112 MMHG

## 2018-06-26 VITALS — SYSTOLIC BLOOD PRESSURE: 125 MMHG | DIASTOLIC BLOOD PRESSURE: 67 MMHG

## 2018-06-26 VITALS — DIASTOLIC BLOOD PRESSURE: 55 MMHG | SYSTOLIC BLOOD PRESSURE: 92 MMHG

## 2018-06-26 LAB
BASOPHILS NFR BLD: 0.2 % (ref 0–2)
BUN SERPL-MCNC: 12 MG/DL (ref 7–18)
CALCIUM SERPL-MCNC: 8.8 MG/DL (ref 8.5–10.1)
CHLORIDE SERPL-SCNC: 103 MMOL/L (ref 98–107)
CO2 SERPL-SCNC: 30.6 MMOL/L (ref 21–32)
CREAT SERPL-MCNC: 0.7 MG/DL (ref 0.6–1)
ERYTHROCYTE [DISTWIDTH] IN BLOOD BY AUTOMATED COUNT: 16.9 % (ref 11.5–14.5)
GFR SERPLBLD BASED ON 1.73 SQ M-ARVRAT: (no result) ML/MIN
GLUCOSE SERPL-MCNC: 95 MG/DL (ref 74–106)
PLATELET # BLD: 196 X10^3MCL (ref 130–400)
POTASSIUM SERPL-SCNC: 4.5 MMOL/L (ref 3.5–5.1)
SODIUM SERPL-SCNC: 139 MMOL/L (ref 136–145)

## 2018-06-27 VITALS — DIASTOLIC BLOOD PRESSURE: 69 MMHG | SYSTOLIC BLOOD PRESSURE: 109 MMHG

## 2018-06-27 VITALS — SYSTOLIC BLOOD PRESSURE: 112 MMHG | DIASTOLIC BLOOD PRESSURE: 65 MMHG

## 2018-06-27 VITALS — DIASTOLIC BLOOD PRESSURE: 70 MMHG | SYSTOLIC BLOOD PRESSURE: 109 MMHG

## 2018-06-27 LAB
BASOPHILS NFR BLD: 0.2 % (ref 0–2)
BUN SERPL-MCNC: 13 MG/DL (ref 7–18)
CALCIUM SERPL-MCNC: 8.6 MG/DL (ref 8.5–10.1)
CHLORIDE SERPL-SCNC: 106 MMOL/L (ref 98–107)
CO2 SERPL-SCNC: 29 MMOL/L (ref 21–32)
CREAT SERPL-MCNC: 0.7 MG/DL (ref 0.6–1)
ERYTHROCYTE [DISTWIDTH] IN BLOOD BY AUTOMATED COUNT: 16.9 % (ref 11.5–14.5)
GFR SERPLBLD BASED ON 1.73 SQ M-ARVRAT: (no result) ML/MIN
GLUCOSE SERPL-MCNC: 94 MG/DL (ref 74–106)
PLATELET # BLD: 216 X10^3MCL (ref 130–400)
POTASSIUM SERPL-SCNC: 4.5 MMOL/L (ref 3.5–5.1)
SODIUM SERPL-SCNC: 140 MMOL/L (ref 136–145)

## 2018-06-28 VITALS — SYSTOLIC BLOOD PRESSURE: 98 MMHG | DIASTOLIC BLOOD PRESSURE: 65 MMHG

## 2018-06-28 VITALS — SYSTOLIC BLOOD PRESSURE: 101 MMHG | DIASTOLIC BLOOD PRESSURE: 53 MMHG

## 2018-06-28 VITALS — DIASTOLIC BLOOD PRESSURE: 59 MMHG | SYSTOLIC BLOOD PRESSURE: 96 MMHG

## 2018-06-28 VITALS — DIASTOLIC BLOOD PRESSURE: 81 MMHG | SYSTOLIC BLOOD PRESSURE: 121 MMHG

## 2018-06-28 LAB
BASOPHILS NFR BLD: 0.3 % (ref 0–2)
BUN SERPL-MCNC: 18 MG/DL (ref 7–18)
CALCIUM SERPL-MCNC: 9 MG/DL (ref 8.5–10.1)
CHLORIDE SERPL-SCNC: 104 MMOL/L (ref 98–107)
CO2 SERPL-SCNC: 28.1 MMOL/L (ref 21–32)
CREAT SERPL-MCNC: 0.8 MG/DL (ref 0.6–1)
ERYTHROCYTE [DISTWIDTH] IN BLOOD BY AUTOMATED COUNT: 17 % (ref 11.5–14.5)
GFR SERPLBLD BASED ON 1.73 SQ M-ARVRAT: (no result) ML/MIN
GLUCOSE SERPL-MCNC: 93 MG/DL (ref 74–106)
PLATELET # BLD: 257 X10^3MCL (ref 130–400)
POTASSIUM SERPL-SCNC: 4.8 MMOL/L (ref 3.5–5.1)
SODIUM SERPL-SCNC: 140 MMOL/L (ref 136–145)

## 2018-08-02 ENCOUNTER — HOSPITAL ENCOUNTER (EMERGENCY)
Dept: HOSPITAL 1 - ED | Age: 78
LOS: 1 days | Discharge: HOME | End: 2018-08-03
Payer: COMMERCIAL

## 2018-08-02 VITALS — BODY MASS INDEX: 23.11 KG/M2 | WEIGHT: 156 LBS | HEIGHT: 69 IN
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
## 2018-08-02 DIAGNOSIS — I11.0: ICD-10-CM
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
## 2018-08-02 DIAGNOSIS — S00.03XA: Primary | ICD-10-CM

## 2018-08-02 DIAGNOSIS — I50.9: ICD-10-CM

## 2018-08-02 DIAGNOSIS — S50.01XA: ICD-10-CM
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
## 2018-08-02 DIAGNOSIS — Z90.710: ICD-10-CM

## 2018-08-02 DIAGNOSIS — Z88.1: ICD-10-CM

## 2018-08-02 DIAGNOSIS — I48.91: ICD-10-CM
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
## 2018-08-03 VITALS — DIASTOLIC BLOOD PRESSURE: 100 MMHG | SYSTOLIC BLOOD PRESSURE: 133 MMHG

## 2018-08-18 ENCOUNTER — HOSPITAL ENCOUNTER (INPATIENT)
Dept: HOSPITAL 1 - ED | Age: 78
LOS: 3 days | Discharge: HOME | DRG: 308 | End: 2018-08-21
Attending: INTERNAL MEDICINE | Admitting: INTERNAL MEDICINE
Payer: COMMERCIAL

## 2018-08-18 VITALS — DIASTOLIC BLOOD PRESSURE: 50 MMHG | SYSTOLIC BLOOD PRESSURE: 117 MMHG

## 2018-08-18 VITALS
BODY MASS INDEX: 23.7 KG/M2 | HEIGHT: 69 IN | WEIGHT: 160 LBS | WEIGHT: 160 LBS | HEIGHT: 69 IN | BODY MASS INDEX: 23.7 KG/M2

## 2018-08-18 VITALS — DIASTOLIC BLOOD PRESSURE: 51 MMHG | SYSTOLIC BLOOD PRESSURE: 109 MMHG

## 2018-08-18 DIAGNOSIS — Z85.3: ICD-10-CM

## 2018-08-18 DIAGNOSIS — I48.0: ICD-10-CM

## 2018-08-18 DIAGNOSIS — I11.0: ICD-10-CM

## 2018-08-18 DIAGNOSIS — I25.5: ICD-10-CM

## 2018-08-18 DIAGNOSIS — I50.33: ICD-10-CM

## 2018-08-18 DIAGNOSIS — I25.10: ICD-10-CM

## 2018-08-18 DIAGNOSIS — R55: ICD-10-CM

## 2018-08-18 DIAGNOSIS — M19.90: ICD-10-CM

## 2018-08-18 DIAGNOSIS — D64.9: ICD-10-CM

## 2018-08-18 DIAGNOSIS — E11.9: ICD-10-CM

## 2018-08-18 DIAGNOSIS — I95.9: ICD-10-CM

## 2018-08-18 DIAGNOSIS — I44.0: ICD-10-CM

## 2018-08-18 DIAGNOSIS — I49.8: Primary | ICD-10-CM

## 2018-08-18 DIAGNOSIS — I34.0: ICD-10-CM

## 2018-08-18 LAB
ALBUMIN SERPL-MCNC: 3.1 G/DL (ref 3.4–5)
ALP SERPL-CCNC: 69 U/L (ref 46–116)
ALT SERPL-CCNC: 27 U/L (ref 14–59)
AMPHETAMINES UR QL SCN: (no result)
AMYLASE SERPL-CCNC: 18 U/L (ref 25–115)
AST SERPL-CCNC: 19 U/L (ref 15–37)
BASOPHILS NFR BLD: 0.3 % (ref 0–2)
BILIRUB SERPL-MCNC: 0.3 MG/DL (ref 0.2–1)
BUN SERPL-MCNC: 20 MG/DL (ref 7–18)
CALCIUM SERPL-MCNC: 8.2 MG/DL (ref 8.5–10.1)
CHLORIDE SERPL-SCNC: 103 MMOL/L (ref 98–107)
CHOLEST SERPL-MCNC: 203 MG/DL (ref ?–200)
CHOLEST/HDLC SERPL: 4.6 MG/DL
CO2 SERPL-SCNC: 24.1 MMOL/L (ref 21–32)
CREAT SERPL-MCNC: 0.9 MG/DL (ref 0.6–1)
ERYTHROCYTE [DISTWIDTH] IN BLOOD BY AUTOMATED COUNT: 17.6 % (ref 11.5–14.5)
GFR SERPLBLD BASED ON 1.73 SQ M-ARVRAT: (no result) ML/MIN
GLUCOSE SERPL-MCNC: 125 MG/DL (ref 74–106)
HDLC SERPL-MCNC: 44 MG/DL (ref 40–60)
LIPASE SERPL-CCNC: 116 IU/L (ref 73–393)
MAGNESIUM SERPL-MCNC: 2 MG/DL (ref 1.8–2.4)
MICROSCOPIC UR-IMP: NO
PLATELET # BLD: 230 X10^3MCL (ref 130–400)
POTASSIUM SERPL-SCNC: 4.3 MMOL/L (ref 3.5–5.1)
PROT SERPL-MCNC: 7 G/DL (ref 6.4–8.2)
RBC # UR STRIP.AUTO: NEGATIVE /UL
SODIUM SERPL-SCNC: 138 MMOL/L (ref 136–145)
T4 FREE SERPL-MCNC: 1.04 NG/DL (ref 0.76–1.46)
TRIGL SERPL-MCNC: 74 MG/DL (ref ?–150)
UA SPECIFIC GRAVITY: 1.01 (ref 1–1.03)

## 2018-08-19 VITALS — SYSTOLIC BLOOD PRESSURE: 118 MMHG | DIASTOLIC BLOOD PRESSURE: 66 MMHG

## 2018-08-19 VITALS — DIASTOLIC BLOOD PRESSURE: 75 MMHG | SYSTOLIC BLOOD PRESSURE: 142 MMHG

## 2018-08-19 VITALS — DIASTOLIC BLOOD PRESSURE: 61 MMHG | SYSTOLIC BLOOD PRESSURE: 124 MMHG

## 2018-08-19 VITALS — DIASTOLIC BLOOD PRESSURE: 58 MMHG | SYSTOLIC BLOOD PRESSURE: 124 MMHG

## 2018-08-19 VITALS — SYSTOLIC BLOOD PRESSURE: 124 MMHG | DIASTOLIC BLOOD PRESSURE: 63 MMHG

## 2018-08-19 LAB
BASOPHILS NFR BLD: 0.4 % (ref 0–2)
BUN SERPL-MCNC: 15 MG/DL (ref 7–18)
CALCIUM SERPL-MCNC: 8.4 MG/DL (ref 8.5–10.1)
CHLORIDE SERPL-SCNC: 106 MMOL/L (ref 98–107)
CO2 SERPL-SCNC: 27.3 MMOL/L (ref 21–32)
CREAT SERPL-MCNC: 0.8 MG/DL (ref 0.6–1)
ERYTHROCYTE [DISTWIDTH] IN BLOOD BY AUTOMATED COUNT: 18 % (ref 11.5–14.5)
GFR SERPLBLD BASED ON 1.73 SQ M-ARVRAT: (no result) ML/MIN
GLUCOSE SERPL-MCNC: 91 MG/DL (ref 74–106)
PLATELET # BLD: 168 X10^3MCL (ref 130–400)
POTASSIUM SERPL-SCNC: 4.1 MMOL/L (ref 3.5–5.1)
SODIUM SERPL-SCNC: 140 MMOL/L (ref 136–145)

## 2018-08-20 VITALS — DIASTOLIC BLOOD PRESSURE: 60 MMHG | SYSTOLIC BLOOD PRESSURE: 118 MMHG

## 2018-08-20 VITALS — DIASTOLIC BLOOD PRESSURE: 56 MMHG | SYSTOLIC BLOOD PRESSURE: 125 MMHG

## 2018-08-20 VITALS — SYSTOLIC BLOOD PRESSURE: 148 MMHG | DIASTOLIC BLOOD PRESSURE: 71 MMHG

## 2018-08-20 VITALS — DIASTOLIC BLOOD PRESSURE: 58 MMHG | SYSTOLIC BLOOD PRESSURE: 137 MMHG

## 2018-08-20 VITALS — SYSTOLIC BLOOD PRESSURE: 113 MMHG | DIASTOLIC BLOOD PRESSURE: 74 MMHG

## 2018-08-21 VITALS — SYSTOLIC BLOOD PRESSURE: 141 MMHG | DIASTOLIC BLOOD PRESSURE: 66 MMHG

## 2018-08-21 VITALS — DIASTOLIC BLOOD PRESSURE: 66 MMHG | SYSTOLIC BLOOD PRESSURE: 141 MMHG

## 2018-08-21 VITALS — DIASTOLIC BLOOD PRESSURE: 47 MMHG | SYSTOLIC BLOOD PRESSURE: 110 MMHG

## 2018-08-21 VITALS — SYSTOLIC BLOOD PRESSURE: 123 MMHG | DIASTOLIC BLOOD PRESSURE: 57 MMHG

## 2018-08-21 VITALS — DIASTOLIC BLOOD PRESSURE: 64 MMHG | SYSTOLIC BLOOD PRESSURE: 118 MMHG

## 2018-10-01 ENCOUNTER — HOSPITAL ENCOUNTER (OUTPATIENT)
Dept: HOSPITAL 1 - RD | Age: 78
Discharge: HOME | End: 2018-10-01
Attending: FAMILY MEDICINE
Payer: COMMERCIAL

## 2018-10-01 DIAGNOSIS — M25.511: Primary | ICD-10-CM

## 2019-10-16 ENCOUNTER — HOSPITAL ENCOUNTER (INPATIENT)
Dept: HOSPITAL 1 - ED | Age: 79
LOS: 1 days | Discharge: HOME | DRG: 124 | End: 2019-10-17
Attending: HOSPITALIST | Admitting: HOSPITALIST
Payer: COMMERCIAL

## 2019-10-16 VITALS
WEIGHT: 158.31 LBS | BODY MASS INDEX: 23.45 KG/M2 | WEIGHT: 158.31 LBS | HEIGHT: 69 IN | BODY MASS INDEX: 23.45 KG/M2 | HEIGHT: 69 IN

## 2019-10-16 DIAGNOSIS — E87.1: ICD-10-CM

## 2019-10-16 DIAGNOSIS — N17.0: ICD-10-CM

## 2019-10-16 DIAGNOSIS — D53.9: ICD-10-CM

## 2019-10-16 DIAGNOSIS — Z91.81: ICD-10-CM

## 2019-10-16 DIAGNOSIS — F32.9: ICD-10-CM
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
## 2019-10-16 DIAGNOSIS — R26.9: ICD-10-CM

## 2019-10-16 DIAGNOSIS — S42.292A: ICD-10-CM

## 2019-10-16 DIAGNOSIS — Z66: ICD-10-CM

## 2019-10-16 DIAGNOSIS — Z79.01: ICD-10-CM

## 2019-10-16 DIAGNOSIS — S01.112A: Primary | ICD-10-CM

## 2019-10-16 DIAGNOSIS — I11.0: ICD-10-CM
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
## 2019-10-16 DIAGNOSIS — I50.9: ICD-10-CM

## 2019-10-16 DIAGNOSIS — E78.5: ICD-10-CM

## 2019-10-16 DIAGNOSIS — Z85.3: ICD-10-CM

## 2019-10-16 DIAGNOSIS — I48.91: ICD-10-CM

## 2019-10-16 DIAGNOSIS — Z99.3: ICD-10-CM

## 2019-10-16 PROCEDURE — G0378 HOSPITAL OBSERVATION PER HR: HCPCS

## 2019-10-16 NOTE — NUR
PT BIB [facility], S/P MECHANICAL FALL AT HOME. PER EMT, PT FELL WHILE TRYING TO GET
INTO BED, HITTING HER HEAD ON THE WAY DOWN, LANDING ON LEFT SHOULDER. 1CM LAC
NOTED TO LEFT EYEBROW, BLEEDING CONTROLLED. PT DENIES LOC, AND N/V. PT REPORTS
10/10 ACHEY PAIN IN LEFT SHOULDER, AND STS THAT SHE CANNOT  HER LEFT ARM.
CMS INTACT. NO SWELLING NOTED. PT SPEAKING CLEARLY, IN FULL SENTENCES,
AMSWERING QUESTIONS APPROPRIATELY. PT REPORTS NO OTHER COMPLAINTS. NAD NOTED AT
THIS TIME. MD AT BEDSIDE FOR MSE. WILL CONTINUE TO MONITOR.

## 2019-10-17 VITALS — DIASTOLIC BLOOD PRESSURE: 66 MMHG | SYSTOLIC BLOOD PRESSURE: 133 MMHG

## 2019-10-17 VITALS — DIASTOLIC BLOOD PRESSURE: 69 MMHG | SYSTOLIC BLOOD PRESSURE: 110 MMHG

## 2019-10-17 VITALS — DIASTOLIC BLOOD PRESSURE: 72 MMHG | SYSTOLIC BLOOD PRESSURE: 122 MMHG

## 2019-10-17 LAB
ALBUMIN SERPL-MCNC: 3.3 G/DL (ref 3.4–5)
ALP SERPL-CCNC: 71 U/L (ref 46–116)
ALT SERPL-CCNC: 12 U/L (ref 14–59)
AMPHETAMINES UR QL SCN: (no result)
AST SERPL-CCNC: 11 U/L (ref 15–37)
BASOPHILS NFR BLD: 0.1 % (ref 0–2)
BASOPHILS NFR BLD: 0.2 % (ref 0–2)
BILIRUB SERPL-MCNC: 0.2 MG/DL (ref 0.2–1)
BUN SERPL-MCNC: 18 MG/DL (ref 7–18)
BUN SERPL-MCNC: 23 MG/DL (ref 7–18)
CALCIUM SERPL-MCNC: 8.2 MG/DL (ref 8.5–10.1)
CALCIUM SERPL-MCNC: 8.2 MG/DL (ref 8.5–10.1)
CHLORIDE SERPL-SCNC: 98 MMOL/L (ref 98–107)
CHLORIDE SERPL-SCNC: 99 MMOL/L (ref 98–107)
CHOLEST SERPL-MCNC: 213 MG/DL (ref ?–200)
CHOLEST/HDLC SERPL: 5.5 MG/DL
CO2 SERPL-SCNC: 25.2 MMOL/L (ref 21–32)
CO2 SERPL-SCNC: 27.9 MMOL/L (ref 21–32)
CREAT SERPL-MCNC: 0.8 MG/DL (ref 0.6–1)
CREAT SERPL-MCNC: 0.9 MG/DL (ref 0.6–1)
ERYTHROCYTE [DISTWIDTH] IN BLOOD BY AUTOMATED COUNT: 13 % (ref 11.5–14.5)
ERYTHROCYTE [DISTWIDTH] IN BLOOD BY AUTOMATED COUNT: 13.4 % (ref 11.5–14.5)
GFR SERPLBLD BASED ON 1.73 SQ M-ARVRAT: (no result) ML/MIN
GFR SERPLBLD BASED ON 1.73 SQ M-ARVRAT: (no result) ML/MIN
GLUCOSE SERPL-MCNC: 114 MG/DL (ref 74–106)
GLUCOSE SERPL-MCNC: 123 MG/DL (ref 74–106)
HDLC SERPL-MCNC: 39 MG/DL (ref 40–60)
LIPASE SERPL-CCNC: 115 IU/L (ref 73–393)
MICROSCOPIC UR-IMP: YES
PLATELET # BLD: 205 X10^3MCL (ref 130–400)
PLATELET # BLD: 221 X10^3MCL (ref 130–400)
POTASSIUM SERPL-SCNC: 4.3 MMOL/L (ref 3.5–5.1)
POTASSIUM SERPL-SCNC: 4.5 MMOL/L (ref 3.5–5.1)
PROT SERPL-MCNC: 7.2 G/DL (ref 6.4–8.2)
RBC # UR STRIP.AUTO: (no result) /UL
SODIUM SERPL-SCNC: 133 MMOL/L (ref 136–145)
SODIUM SERPL-SCNC: 133 MMOL/L (ref 136–145)
TRIGL SERPL-MCNC: 77 MG/DL (ref ?–150)
UA SPECIFIC GRAVITY: 1.01 (ref 1–1.03)

## 2019-10-17 NOTE — NUR
ASSISTED PT TO BEDSIDE COMODE. PT AMBULATED W/STEADY GAIT TO AND FROM COMODE.
NO S/S OF DISTRESS. RESP E/U. WILL CONTINUE TO MONITOR.

## 2019-10-17 NOTE — NUR
AT 0730 - RECEIVED PATIENT FROM NIGHT NURSE. AWAKE, ALERT AND ORIENTED.
LEFT ARM IN SPLINT AND BANDAGE. PATIENT SITTING ON SIDE OF BED. ASSISTED WITH
BREAKFAST.
AT 0755 - BACK IN BED. LEFT ARM SUPPORTED WITH PILLOW. ICE TO LEFT SHOULDER.
NOTED DEVELOPING ECCYMOSIS TO LEFT UPPER ARM. PAIN APPEARS UNDER CONTROL AT
THIS TIME. CALL LIGHT WITHIN REACH. PATIENT INSTRUCTED TO CALL FOR NURSE AND
NOT TO ATTEMPT GETTING OUT OF BED UNASSISTED.

## 2019-10-17 NOTE — NUR
PRINTED DISCHARGE INSTRUCTIONS GIVEN AND EXPLAINED TO PATIENT AND DAUGHTERS.
PRESCRIPTIONS HAVE BEEN RECEIVED BY PATIENT'S PREFERRED PHARMACY. QUAD CANE
HAS ALREADY BEEN DELIVERED TO PATIENT.
IV CATHETER REMOVED INTACT. TAKEN OFF CARDIAC MONITORING. PREPARED FOR
DISCHARGE.

## 2019-10-17 NOTE — NUR
PT MEDICATED PER ORDER PT VERBALIZED UNDERSTANDING OF MEDICATIO TEACHING. SEE
EMAR FOR DETAILS. CONSENT TO TDAP BOOSTER IN CHART. EMT AT BEDSIDE FOR WOUND
CARE. LAB AT BEDSIDE FOR BLOOD DRAW.

## 2019-10-17 NOTE — NUR
ASSISTED PT TO THE BEDSIDE COMMODE. PT IS HAS A WEAK GAIT AND NEEDS
ASSISTANCE. SLIGHT SOB NOTED BUT PT TOLERATED WELL. PT ASSISTED BACK TO BED
AND MADE COMFORTABLE. BED IS AT LOWEST SETTING. CALL LIGHT WITHIN REACH. WILL
ENDORSE TO AM NURSE.

## 2019-10-17 NOTE — NUR
RECEIVED DISCHARGE ORDERS. PATIENT IS DRESSED AND RESTING ON TOP OF BED. 4
POINT CANE WILL BE DELIVERED TO PATIENT'S HOME.

## 2019-10-17 NOTE — NUR
RECIEVED PT FROM ED. PT CALM IN BED. NO SIGN OF DISTRESS NOTED. DAUGHTERS AT
BEDSIDE. A/O x4. ON TELE #22, SR WITH A 1ST DEGREE. PT DENIES ANY CHEST PAIN
OR PRESSURE. EDEMA NOTED ON L SHOULDER. LIGHT PURPLISH DISCOLORATION NOTED ON
L SHOULDER. ICE IN PLACE FOR COMFORT. L BROW LACERATION NOTED D/T FALL.
STERI STRIPS IN PLACE. SUPERFICIAL, NO DRAINGE NOTED. PT STATED SHE FELL
TRYING TO GET INTO BED FROM HER WHEELCHAIR. DENIES ANY SYNCOPE OR VERTIGO WHEN
SHE FELL, STATING THAT SHE JUST SLIPPED. PT COMPLAIN OF 5/10 PAIN ON L
SHOULDER BUT REFUSES PAIN MEDICATION AT THIS TIME STATING THE ICE IS HELPING.
MRSA WAS OBTAINED.  PICTURES TAKEN ON L BROW LACERATION. MD ZENG WAS PAGED
GATED ON POSITIVE UTI. BED IS AT LOWEST SETTING. CALL LIGHT WITHIN REACH. WILL
CONTINUE TO MONTIOR.

## 2019-10-17 NOTE — NUR
MEDICATED WITH NORCO AS PER EMAR FOR C/O R SHOULDER PAIN. ICE PACK IN PLACE.
ARM SUPPORTED BY PILLOW AND SLING.

## 2019-10-17 NOTE — NUR
AT 1015 - IV ROCEPHIN IN PROGRESS. PATIENT'S DAUGHTER AT BEDSIDE. LEFT ARM
PLACED IN SLING AS PER NEW ORDERS.
PLAN FOR PATIENT TO DC HOME TODAY AS PER DOCTORS' ROUNDS PLAN.
AT 1215 - HAS BEEN SEEN BY PHYSICAL THERAPY. PATIENT AMBULATED AND ISNTRUCTED
IN USE OF 4 PRONG CANE.
AT 1230 - SITTING ON SIDE OF BED EATING LUNCH.

## 2019-10-17 NOTE — NUR
PT AND FAMILY PREFFER NOT TO USE SLING AT THIS TIME. PT IS MORE COMFORTABLE
WITH ARM SPLINTED IN POSITION WHILE LAYING IN BED. PT AND FAMILY INSTRUCTED ON
HOW TO RE-APPLY SLING WHEN SITTING OR STANDING. RESIDENT MD AT BEDSIDE,
VERBALIZED OKAY TO NOT APPLY SLING WHILE PT IS RESTING IN BED.

## 2019-10-17 NOTE — NUR
PT TRANSFERRED TO TELE FLOOR ACCOMPANIED BY 2 RNS. RN AT BEDSIDE TO ASSUME CARE
OF PT. PT AMBULATED FROM [facility] TO [facility] W/STEADY GAIT. NO S/S OF
DISTRESS. RESP E/U. PT CONNECTED TO MONITOR DURING TRANSFER. IV SITE PATENT, NO
S/S OF INFILTRATION.

## 2024-02-12 NOTE — NUR
PT SITTING UP IN CHAIR. NO REPORT OF PAIN. NO SIGN OF ACUTE DISTRESS. IV
FLUIDS FLOWING. WILL CONTINUE TO MONITOR. The patient is Stable - Low risk of patient condition declining or worsening    Problem: Knowledge Deficit - Standard  Goal: Patient and family/care givers will demonstrate understanding of plan of care, disease process/condition, diagnostic tests and medications  Outcome: Progressing. Reviewed POC, all questions answered.        Problem: Fall Risk - Rehab  Goal: Patient will remain free from falls  Outcome: Progressing. Call light within reach, pt educated to use for assistance for safe transferring.      Shift Goals  Clinical Goals: Safety  Patient Goals: Participate in therapy